# Patient Record
Sex: MALE | Race: WHITE | NOT HISPANIC OR LATINO | URBAN - METROPOLITAN AREA
[De-identification: names, ages, dates, MRNs, and addresses within clinical notes are randomized per-mention and may not be internally consistent; named-entity substitution may affect disease eponyms.]

---

## 2024-06-08 ENCOUNTER — EMERGENCY (EMERGENCY)
Facility: HOSPITAL | Age: 34
LOS: 1 days | Discharge: HOME | End: 2024-06-08
Admitting: EMERGENCY MEDICINE
Payer: SELF-PAY

## 2024-06-08 VITALS
OXYGEN SATURATION: 100 % | SYSTOLIC BLOOD PRESSURE: 115 MMHG | DIASTOLIC BLOOD PRESSURE: 75 MMHG | RESPIRATION RATE: 18 BRPM | HEART RATE: 52 BPM | TEMPERATURE: 98 F

## 2024-06-08 VITALS
HEART RATE: 60 BPM | DIASTOLIC BLOOD PRESSURE: 96 MMHG | OXYGEN SATURATION: 98 % | RESPIRATION RATE: 19 BRPM | HEIGHT: 75.59 IN | TEMPERATURE: 98 F | WEIGHT: 202.83 LBS | SYSTOLIC BLOOD PRESSURE: 134 MMHG

## 2024-06-08 DIAGNOSIS — S67.192A CRUSHING INJURY OF RIGHT MIDDLE FINGER, INITIAL ENCOUNTER: ICD-10-CM
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
## 2024-06-08 DIAGNOSIS — S62.632A DISPLACED FRACTURE OF DISTAL PHALANX OF RIGHT MIDDLE FINGER, INITIAL ENCOUNTER FOR CLOSED FRACTURE: ICD-10-CM
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
## 2024-06-08 PROCEDURE — 99284 EMERGENCY DEPT VISIT MOD MDM: CPT | Mod: 57

## 2024-06-08 PROCEDURE — 26750 TREAT FINGER FRACTURE EACH: CPT | Mod: 54,F7

## 2024-06-08 PROCEDURE — 73130 X-RAY EXAM OF HAND: CPT | Mod: 26,RT

## 2024-06-08 RX ORDER — IBUPROFEN 200 MG
600 TABLET ORAL ONCE
Refills: 0 | Status: COMPLETED | OUTPATIENT
Start: 2024-06-08 | End: 2024-06-08

## 2024-06-08 RX ADMIN — Medication 600 MILLIGRAM(S): at 12:28

## 2024-06-08 NOTE — ED PROVIDER NOTE - CLINICAL SUMMARY MEDICAL DECISION MAKING FREE TEXT BOX
32 yo m well appearing here with crush inj to the R 3 rd digit cause by door closing on the finger, occurred 1 hr pta, since then pt has subungual hematoma with small abrasion at the finger tip. +R 3rd digit ttp with subungual hematoma and ttp

## 2024-06-08 NOTE — ED PROVIDER NOTE - PATIENT PORTAL LINK FT
You can access the FollowMyHealth Patient Portal offered by [facility] by registering at the following website: [email or web address]. By joining [facility]’s FollowMyHealth portal, you will also be able to view your health information using other applications (apps) compatible with our system.

## 2024-06-08 NOTE — ED PROVIDER NOTE - NSFOLLOWUPINSTRUCTIONS_ED_ALL_ED_FT
you have finger tip fracture also know as tuft fracture continue jen the splint for the next 6 wk or until cleared by your primary care doctor

## 2024-06-08 NOTE — ED PROVIDER NOTE - OBJECTIVE STATEMENT
32 yo m well appearing here with crush inj to the R 3 rd digit cause by door closing on the finger, occurred 1 hr pta, since then pt has subungual hematoma with small abrasion at the finger tip.     I have reviewed available current nursing and previous documentation of past medical, surgical, family, and/or social history.

## 2024-06-08 NOTE — ED PROVIDER NOTE - PHYSICAL EXAMINATION
Physical Exam    Vital Signs: I have reviewed the initial vital signs.  Constitutional: well-appearing, appears stated age  Cardiovascular: regular rate, regular rhythm, well-perfused extremities, cap refill <2 sec b/l  Musculoskeletal: supple neck, +R 3d digit tip ttp with subungual hematoma, no laceration, +abrasion finger tip  Integumentary: warm, dry, no rash  Neurologic: UE extremities’ motor and sensory functions grossly intact

## 2024-06-08 NOTE — ED ADULT NURSE NOTE - OBJECTIVE STATEMENT
Pt presents to ED A&Ox4 with c/o injury to right 3rd finger. Pt reports accidentally closing fire door on right 3rd finger earlier today. Denies dizziness, LOC prior to incident. Pt reports 3/10 pain at this time and reports tingling sensation to affected digit. Upon inspection, edema of right 3rd finger observed and no active bleeding is present; pt maintains motor and sensory function at this time.